# Patient Record
Sex: FEMALE | Race: OTHER
[De-identification: names, ages, dates, MRNs, and addresses within clinical notes are randomized per-mention and may not be internally consistent; named-entity substitution may affect disease eponyms.]

---

## 2020-03-27 ENCOUNTER — HOSPITAL ENCOUNTER (EMERGENCY)
Dept: HOSPITAL 72 - EMR | Age: 50
Discharge: HOME | End: 2020-03-27
Payer: MEDICAID

## 2020-03-27 VITALS — SYSTOLIC BLOOD PRESSURE: 135 MMHG | DIASTOLIC BLOOD PRESSURE: 82 MMHG

## 2020-03-27 VITALS — DIASTOLIC BLOOD PRESSURE: 78 MMHG | SYSTOLIC BLOOD PRESSURE: 136 MMHG

## 2020-03-27 VITALS — BODY MASS INDEX: 30.73 KG/M2 | WEIGHT: 180 LBS | HEIGHT: 64 IN

## 2020-03-27 VITALS — SYSTOLIC BLOOD PRESSURE: 143 MMHG | DIASTOLIC BLOOD PRESSURE: 81 MMHG

## 2020-03-27 DIAGNOSIS — E11.9: ICD-10-CM

## 2020-03-27 DIAGNOSIS — J18.9: Primary | ICD-10-CM

## 2020-03-27 LAB
ADD MANUAL DIFF: NO
ALBUMIN SERPL-MCNC: 3.6 G/DL (ref 3.4–5)
ALBUMIN/GLOB SERPL: 0.8 {RATIO} (ref 1–2.7)
ALP SERPL-CCNC: 114 U/L (ref 46–116)
ALT SERPL-CCNC: 22 U/L (ref 12–78)
ANION GAP SERPL CALC-SCNC: 12 MMOL/L (ref 5–15)
APPEARANCE UR: CLEAR
APTT BLD: 28 SEC (ref 23–33)
APTT PPP: (no result) S
AST SERPL-CCNC: 19 U/L (ref 15–37)
BASOPHILS NFR BLD AUTO: 0.4 % (ref 0–2)
BILIRUB SERPL-MCNC: 0.2 MG/DL (ref 0.2–1)
BUN SERPL-MCNC: 13 MG/DL (ref 7–18)
CALCIUM SERPL-MCNC: 9.2 MG/DL (ref 8.5–10.1)
CHLORIDE SERPL-SCNC: 98 MMOL/L (ref 98–107)
CO2 SERPL-SCNC: 23 MMOL/L (ref 21–32)
CREAT SERPL-MCNC: 0.7 MG/DL (ref 0.55–1.3)
EOSINOPHIL NFR BLD AUTO: 0 % (ref 0–3)
ERYTHROCYTE [DISTWIDTH] IN BLOOD BY AUTOMATED COUNT: 16.1 % (ref 11.6–14.8)
GLOBULIN SER-MCNC: 4.3 G/DL
GLUCOSE UR STRIP-MCNC: (no result) MG/DL
HCT VFR BLD CALC: 37.5 % (ref 37–47)
HGB BLD-MCNC: 11.7 G/DL (ref 12–16)
INR PPP: 0.9 (ref 0.9–1.1)
KETONES UR QL STRIP: NEGATIVE
LEUKOCYTE ESTERASE UR QL STRIP: (no result)
LYMPHOCYTES NFR BLD AUTO: 19 % (ref 20–45)
MCV RBC AUTO: 81 FL (ref 80–99)
MONOCYTES NFR BLD AUTO: 3.3 % (ref 1–10)
NEUTROPHILS NFR BLD AUTO: 77.3 % (ref 45–75)
NITRITE UR QL STRIP: NEGATIVE
PH UR STRIP: 5 [PH] (ref 4.5–8)
PLATELET # BLD: 321 K/UL (ref 150–450)
POTASSIUM SERPL-SCNC: 4.3 MMOL/L (ref 3.5–5.1)
PROT UR QL STRIP: NEGATIVE
RBC # BLD AUTO: 4.65 M/UL (ref 4.2–5.4)
SODIUM SERPL-SCNC: 133 MMOL/L (ref 136–145)
SP GR UR STRIP: 1.01 (ref 1–1.03)
UROBILINOGEN UR-MCNC: NORMAL MG/DL (ref 0–1)
WBC # BLD AUTO: 6.9 K/UL (ref 4.8–10.8)

## 2020-03-27 PROCEDURE — 80053 COMPREHEN METABOLIC PANEL: CPT

## 2020-03-27 PROCEDURE — 85610 PROTHROMBIN TIME: CPT

## 2020-03-27 PROCEDURE — 36415 COLL VENOUS BLD VENIPUNCTURE: CPT

## 2020-03-27 PROCEDURE — 99284 EMERGENCY DEPT VISIT MOD MDM: CPT

## 2020-03-27 PROCEDURE — 86710 INFLUENZA VIRUS ANTIBODY: CPT

## 2020-03-27 PROCEDURE — 96365 THER/PROPH/DIAG IV INF INIT: CPT

## 2020-03-27 PROCEDURE — 81003 URINALYSIS AUTO W/O SCOPE: CPT

## 2020-03-27 PROCEDURE — 93005 ELECTROCARDIOGRAM TRACING: CPT

## 2020-03-27 PROCEDURE — 71045 X-RAY EXAM CHEST 1 VIEW: CPT

## 2020-03-27 PROCEDURE — 71275 CT ANGIOGRAPHY CHEST: CPT

## 2020-03-27 PROCEDURE — 83880 ASSAY OF NATRIURETIC PEPTIDE: CPT

## 2020-03-27 PROCEDURE — 85730 THROMBOPLASTIN TIME PARTIAL: CPT

## 2020-03-27 PROCEDURE — 85025 COMPLETE CBC W/AUTO DIFF WBC: CPT

## 2020-03-27 PROCEDURE — 84484 ASSAY OF TROPONIN QUANT: CPT

## 2020-03-27 NOTE — DIAGNOSTIC IMAGING REPORT
EXAM:

  XR Chest, 1 View

 

CLINICAL HISTORY:

  PAIN

 

TECHNIQUE:

  Frontal view of the chest.

 

COMPARISON:

  No relevant prior studies available.

 

FINDINGS:

  Lungs:  Unremarkable.  No consolidation.

  Pleural space:  Unremarkable.  No pneumothorax.

  Heart:  Unremarkable.  No cardiomegaly.

  Mediastinum:  Unremarkable.

  Bones/joints:  Unremarkable.

 

IMPRESSION:     

  No acute cardiopulmonary disease.

## 2020-03-27 NOTE — EMERGENCY ROOM REPORT
History of Present Illness


General


Chief Complaint:  Back Pain-No Injury


Source:  Family Member





Present Illness


HPI


50-year-old female with history of type 2 diabetes currently controlled with 

medication, here complaining of 1 day of sudden onset of upper back pain and 

shortness of breath when trying to take deep breaths.  Denies any cough and 

congestion, URI symptoms.  Complains of chills however denies fever, abdominal 

pain, nausea vomiting.  Denies any recent travel, or coming in contact with 

patients who have confirmed diagnosis of coronavirus.  Denies any leg swelling 

prior to the pleuritic chest pain.  Denies any frontal chest pain with 

radiation.  Denies any cancer history, taking any birth control pills, recent 

travel.  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 

activity.  Denies any fall or injury.  Denies tingling or numbness.  

Neurovascularly intact.  Denies any unilateral generalized weakness.  Denies 

tobacco smoke, drug use, alcohol intake.


Allergies:  


Coded Allergies:  


     No Known Allergies (Unverified , 3/27/20)





COVID-19 Screening


Contact w/high risk pt:  No


Recent Travel to affected area:  No


Experienced COVID-19 symptoms?:  No





Patient History


Past Medical History:  see triage record


Past Surgical History:  none


Pertinent Family History:  none


Pregnant Now:  No


Immunizations:  UTD


Reviewed Nursing Documentation:  PMH: Agreed; PSxH: Agreed





Nursing Documentation-PMH


Past Medical History:  No History, Except For


Hx Diabetes:  Yes





Review of Systems


All Other Systems:  negative except mentioned in HPI





Physical Exam





Vital Signs








  Date Time  Temp Pulse Resp B/P (MAP) Pulse Ox O2 Delivery O2 Flow Rate FiO2


 


3/27/20 17:27 97.9 114 18 136/78 (97) 97 Room Air  








Sp02 EP Interpretation:  reviewed, normal


General Appearance:  no apparent distress, alert, GCS 15, non-toxic


Head:  normocephalic, atraumatic


Eyes:  bilateral eye normal inspection, bilateral eye PERRL


ENT:  hearing grossly normal, normal pharynx, no angioedema, normal voice


Neck:  full range of motion, supple/symm/no masses


Respiratory:  chest non-tender, no rhonchi, no respiratory distress, no 

retraction, no wheezing, crackles - Right lower lobe, speaking full sentences


Cardiovascular #1:  regular rate, rhythm, no edema


Gastrointestinal:  normal bowel sounds, non tender, soft, non-distended, no 

guarding, no rebound


Rectal:  deferred


Genitourinary:  no CVA tenderness


Musculoskeletal:  back normal, no calf tenderness, pelvis stable, non-tender


Neurologic:  alert, motor strength/tone normal, oriented x3, sensory intact, 

responsive, speech normal


Psychiatric:  judgement/insight normal, memory normal, mood/affect normal, no 

suicidal/homicidal ideation


Skin:  no rash


Lymphatic:  no adenopathy





Medical Decision Making


PA Attestation


Diagnosis and treatment plans were reviewed and discussed with my supervising 

physician Dr. Flores


Diagnostic Impression:  


 Primary Impression:  


 Pneumonia


ER Course


50-year-old female with history of type 2 diabetes currently controlled with 

medication, here complaining of 1 day of sudden onset of upper back pain and 

shortness of breath when trying to take deep breaths.  Denies any cough and 

congestion, URI symptoms.  Complains of chills however denies fever, abdominal 

pain, nausea vomiting.  Denies any recent travel, or coming in contact with 

patients who have confirmed diagnosis of coronavirus.  Denies any leg swelling 

prior to the pleuritic chest pain.  Denies any frontal chest pain with 

radiation.  Denies any cancer history, taking any birth control pills, recent 

travel.  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 

activity.  Denies any fall or injury.  Denies tingling or numbness.  

Neurovascularly intact.  Denies any unilateral generalized weakness.  Denies 

tobacco smoke, drug use, alcohol intake.





Ddx considered but are not limited to: MI, Angina, COPD, GERD, pulmonary 

embolism, pneumonia,














Vital signs: are WNL, pt. is afebrile








 H&PE are most consistent with 


PNA











ORDERS: EKG, Chest XR, cardiac labs(troponin, CBC, CMP,, BNP), influenza swab, 

CTA chest, azithromycin, augmentin 














ED INTERVENTIONS: rocephin and azithromycin

















DISCHARGE: At this time pt. is stable for d/c to home. Will provide printed 

patient care instructions, and any necessary prescriptions. Care plan and 

follow up instructions have been discussed with the patient prior to discharge.

  Take medication as directed, self quarantine, if worsening symptoms return to 

emergency room.  Take medication as directed, follow-up with your primary doctor

, you need to stay home for self quarantine due to Covid 19 precautions for 14 

days


EKG Diagnostic Results


Rate:  tachycardiac


Rhythm:  other - Tachycardic


ST Segments:  no acute changes


Other Impression


No acute ST changes





Chest X-Ray Diagnostic Results


Chest X-Ray Diagnostic Results :  


   Chest X-Ray Ordered:  Yes


   # of Views/Limited/Complete:  1 View


   Indication:  Shortness of Breath


   EP Interpretation:  Yes


   PA Xray:  Interpretation reviewed, by supervising MD, and agrees with 

findings.


   Interpretation:  no consolidation, no effusion, no pneumothorax


   Impression:  No acute disease


   Electronically Signed by:  Matthew Valera PA-C





CT/MRI/US Diagnostic Results


CT/MRI/US Diagnostic Results :  


   Imaging Test Ordered:  CTA chest with contrast


   Impression


FINDINGS:


Limitations: Limited due to motion.


Pulmonary arteries: No definite PE visualized.


Aorta: No aortic dissection.


Lungs: Bilateral scattered pulmonary infiltrates. Cannot exclude underlying 

pulmonary lesion, consider followup to ensure resolution.


Pleural space: Unremarkable. No significant effusion. No pneumothorax.


Heart: Unremarkable.


Bones/joints: No acute fracture.


Soft tissues: Unremarkable.


Lymph nodes: Nonspecific mediastinal and right hilar lymph nodes.





IMPRESSION:


1. Limited due to motion.


2. No definite PE visualized.


3. Bilateral scattered pulmonary infiltrates. Cannot exclude underlying 

pulmonary lesion, consider followup to ensure resolution.





Last Vital Signs








  Date Time  Temp Pulse Resp B/P (MAP) Pulse Ox O2 Delivery O2 Flow Rate FiO2


 


3/27/20 17:40 97.9 75 18 136/78 97 Room Air  








Disposition:  HOME, SELF-CARE


Condition:  Stable


Referrals:  


NOT CHOSEN [facility]/MD,REFERRING (PCP)


Patient Instructions:  Community-Acquired Pneumonia, Adult, Easy-to-Read





Additional Instructions:  


Take medication as directed, follow-up with your primary doctor, you need to 

stay home for self quarantine due to Covid 19 precautions for 14 days











Matthew Mitchell Mar 27, 2020 18:30

## 2020-03-27 NOTE — NUR
ED Nurse Note:



Report received from Joseph Polanco RN. Pt is aaox4, resting in bed. No acute distress 
noted. Pt VSS. Will continue to monitor.

## 2020-03-27 NOTE — DIAGNOSTIC IMAGING REPORT
EXAM:

  CT Angiography Chest With Intravenous Contrast

 

CLINICAL HISTORY:

  PAIN

 

TECHNIQUE:

  Axial computed tomographic angiography images of the chest with 

intravenous contrast.  CTDI is 29 mGy and DLP is 226 mGy-cm.  One or more 

of the following dose reduction techniques were used: automated exposure 

control, adjustment of the mA and/or kV according to patient size, use of 

iterative reconstruction technique.

  MIP reconstructed images were created and reviewed.

 

COMPARISON:

  No relevant prior studies available.

 

FINDINGS:

  Limitations:  Limited due to motion.

  Pulmonary arteries:  No definite PE visualized.

  Aorta:  No aortic dissection.

  Lungs:  Bilateral scattered pulmonary infiltrates.  Cannot exclude 

underlying pulmonary lesion, consider followup to ensure resolution.

  Pleural space:  Unremarkable.  No significant effusion.  No 

pneumothorax.

  Heart:  Unremarkable.

  Bones/joints:  No acute fracture.

  Soft tissues:  Unremarkable.

  Lymph nodes:  Nonspecific mediastinal and right hilar lymph nodes.

 

IMPRESSION:     

1.  Limited due to motion.

2.  No definite PE visualized.

3.  Bilateral scattered pulmonary infiltrates.  Cannot exclude underlying 

pulmonary lesion, consider followup to ensure resolution.

## 2020-03-27 NOTE — NUR
ED Nurse Note:



pt walked in to ER from home due to lower back pain 8/10 without trauma. pt aao 
x4 and ambulatory. skin clean and intact. calm and cooperative. no cardiac or 
pulmonary distress noted at this time. pt provided urine sample.